# Patient Record
Sex: FEMALE
[De-identification: names, ages, dates, MRNs, and addresses within clinical notes are randomized per-mention and may not be internally consistent; named-entity substitution may affect disease eponyms.]

---

## 2020-06-08 LAB
ANION GAP SERPL CALC-SCNC: 14.5 MMOL/L (ref 8–16)
BASOPHILS # BLD AUTO: 0 10*3/UL (ref 0–0.1)
BASOPHILS NFR BLD AUTO: 0.7 % (ref 0–1)
BUN SERPL-MCNC: 21 MG/DL (ref 7–26)
BUN/CREAT SERPL: 18 (ref 6–25)
CALCIUM SERPL-MCNC: 9 MG/DL (ref 8.4–10.2)
CHLORIDE SERPL-SCNC: 108 MMOL/L (ref 98–107)
CO2 SERPL-SCNC: 24 MMOL/L (ref 22–29)
DEPRECATED APTT PLAS QN: 28.1 SECONDS (ref 23.8–35.5)
DEPRECATED INR PLAS: 0.92
DEPRECATED NEUTROPHILS # BLD AUTO: 2.2 10*3/UL (ref 2.1–6.9)
EGFRCR SERPLBLD CKD-EPI 2021: 45 ML/MIN (ref 60–?)
EOSINOPHIL # BLD AUTO: 0.1 10*3/UL (ref 0–0.4)
EOSINOPHIL NFR BLD AUTO: 1.9 % (ref 0–6)
ERYTHROCYTE [DISTWIDTH] IN CORD BLOOD: 13 % (ref 11.7–14.4)
GLUCOSE SERPLBLD-MCNC: 108 MG/DL (ref 74–118)
HCT VFR BLD AUTO: 35.5 % (ref 34.2–44.1)
HGB BLD-MCNC: 11.5 G/DL (ref 12–16)
LYMPHOCYTES # BLD: 1.6 10*3/UL (ref 1–3.2)
LYMPHOCYTES NFR BLD AUTO: 37.8 % (ref 18–39.1)
MCH RBC QN AUTO: 30.1 PG (ref 28–32)
MCHC RBC AUTO-ENTMCNC: 32.4 G/DL (ref 31–35)
MCV RBC AUTO: 92.9 FL (ref 81–99)
MONOCYTES # BLD AUTO: 0.4 10*3/UL (ref 0.2–0.8)
MONOCYTES NFR BLD AUTO: 8.6 % (ref 4.4–11.3)
NEUTS SEG NFR BLD AUTO: 50.8 % (ref 38.7–80)
PLATELET # BLD AUTO: 159 X10E3/UL (ref 140–360)
POTASSIUM SERPL-SCNC: 4.5 MMOL/L (ref 3.5–5.1)
PROTHROMBIN TIME: 12.9 SECONDS (ref 11.9–14.5)
RBC # BLD AUTO: 3.82 X10E6/UL (ref 3.6–5.1)
SODIUM SERPL-SCNC: 142 MMOL/L (ref 136–145)

## 2020-06-08 NOTE — DIAGNOSTIC IMAGING REPORT
EXAM:  CHEST 2 VIEWS



DATE: 6/8/2020 3:56 PM  



INDICATION: Preoperative evaluation 



COMPARISON: None



FINDINGS:

The trachea is midline. The lungs are symmetrically expanded without evidence

for large focal consolidation, pneumothorax, or significant pleural effusion.



The cardiomediastinal silhouette and pulmonary vasculature are within normal

limits. Mild tortuosity noted of the thoracic aorta. Degenerative changes noted

of the visualized spine and shoulders. No acute osseous abnormality is

identified. The surrounding soft tissues are unremarkable.





IMPRESSION:



No acute cardiopulmonary process identified.



Signed by: Dr. Mazin Coleman MD on 6/8/2020 4:45 PM

## 2020-06-11 ENCOUNTER — HOSPITAL ENCOUNTER (OUTPATIENT)
Dept: HOSPITAL 88 - OR | Age: 75
Setting detail: OBSERVATION
LOS: 1 days | Discharge: HOME | End: 2020-06-12
Attending: NEUROLOGICAL SURGERY | Admitting: NEUROLOGICAL SURGERY
Payer: MEDICARE

## 2020-06-11 VITALS — BODY MASS INDEX: 39.15 KG/M2 | WEIGHT: 235 LBS | HEIGHT: 65 IN

## 2020-06-11 VITALS — DIASTOLIC BLOOD PRESSURE: 50 MMHG | SYSTOLIC BLOOD PRESSURE: 116 MMHG

## 2020-06-11 VITALS — SYSTOLIC BLOOD PRESSURE: 137 MMHG | DIASTOLIC BLOOD PRESSURE: 62 MMHG

## 2020-06-11 VITALS — SYSTOLIC BLOOD PRESSURE: 134 MMHG | DIASTOLIC BLOOD PRESSURE: 72 MMHG

## 2020-06-11 VITALS — SYSTOLIC BLOOD PRESSURE: 116 MMHG | DIASTOLIC BLOOD PRESSURE: 50 MMHG

## 2020-06-11 DIAGNOSIS — Z85.528: ICD-10-CM

## 2020-06-11 DIAGNOSIS — Z85.3: ICD-10-CM

## 2020-06-11 DIAGNOSIS — M19.90: ICD-10-CM

## 2020-06-11 DIAGNOSIS — Z90.5: ICD-10-CM

## 2020-06-11 DIAGNOSIS — Z79.84: ICD-10-CM

## 2020-06-11 DIAGNOSIS — I10: ICD-10-CM

## 2020-06-11 DIAGNOSIS — Z01.810: ICD-10-CM

## 2020-06-11 DIAGNOSIS — M51.16: Primary | ICD-10-CM

## 2020-06-11 DIAGNOSIS — Z11.59: ICD-10-CM

## 2020-06-11 DIAGNOSIS — E78.00: ICD-10-CM

## 2020-06-11 DIAGNOSIS — Z90.49: ICD-10-CM

## 2020-06-11 DIAGNOSIS — Z01.812: ICD-10-CM

## 2020-06-11 DIAGNOSIS — Z87.891: ICD-10-CM

## 2020-06-11 DIAGNOSIS — Z01.818: ICD-10-CM

## 2020-06-11 DIAGNOSIS — E11.9: ICD-10-CM

## 2020-06-11 PROCEDURE — 63042 LAMINOTOMY SINGLE LUMBAR: CPT

## 2020-06-11 PROCEDURE — 86850 RBC ANTIBODY SCREEN: CPT

## 2020-06-11 PROCEDURE — 80048 BASIC METABOLIC PNL TOTAL CA: CPT

## 2020-06-11 PROCEDURE — 86900 BLOOD TYPING SEROLOGIC ABO: CPT

## 2020-06-11 PROCEDURE — 85730 THROMBOPLASTIN TIME PARTIAL: CPT

## 2020-06-11 PROCEDURE — 36415 COLL VENOUS BLD VENIPUNCTURE: CPT

## 2020-06-11 PROCEDURE — 72020 X-RAY EXAM OF SPINE 1 VIEW: CPT

## 2020-06-11 PROCEDURE — 85025 COMPLETE CBC W/AUTO DIFF WBC: CPT

## 2020-06-11 PROCEDURE — 93005 ELECTROCARDIOGRAM TRACING: CPT

## 2020-06-11 PROCEDURE — 71046 X-RAY EXAM CHEST 2 VIEWS: CPT

## 2020-06-11 PROCEDURE — 88304 TISSUE EXAM BY PATHOLOGIST: CPT

## 2020-06-11 PROCEDURE — 87635 SARS-COV-2 COVID-19 AMP PRB: CPT

## 2020-06-11 PROCEDURE — 82948 REAGENT STRIP/BLOOD GLUCOSE: CPT

## 2020-06-11 PROCEDURE — 85610 PROTHROMBIN TIME: CPT

## 2020-06-11 RX ADMIN — CEFAZOLIN SODIUM SCH MLS/HR: 1 SOLUTION INTRAVENOUS at 19:59

## 2020-06-11 RX ADMIN — CLONIDINE HYDROCHLORIDE SCH MG: 0.2 TABLET ORAL at 17:41

## 2020-06-11 RX ADMIN — SODIUM CHLORIDE, POTASSIUM CHLORIDE, SODIUM LACTATE AND CALCIUM CHLORIDE SCH MLS/HR: 600; 310; 30; 20 INJECTION, SOLUTION INTRAVENOUS at 22:05

## 2020-06-11 RX ADMIN — METFORMIN HYDROCHLORIDE SCH MG: 500 TABLET, FILM COATED ORAL at 17:41

## 2020-06-11 NOTE — XMS REPORT
Continuity of Care Document

                             Created on: 2020



RUBEN RAO

External Reference #: [id number]

: 1945

Sex: Female



Demographics





                          Address                   [address] [place]  [address]

 

                          Home Phone                [phone]

 

                          Preferred Language        English

 

                          Marital Status            Unknown

 

                          Judaism Affiliation     Unknown

 

                          Race                      Unknown

 

                                        Additional Race(s) 

 

 

                          Ethnic Group              Unknown





Author





                          Author                    [facility]

t

 

                          Organization              [facility]

 

                          Address                   [address]

[place]  [address]



 

                          Phone                     Unavailable







Support





                Name            Relationship    Address         Phone

 

                    RAUL RAO        PRS                 [address]

[place]  [address]                     [phone]

 

                    RAUL RAO        PRS                 [address]

[place]  [address]                     [phone]

 

                    LEBRON RAO      PRS                 [address] [place]  [address]                      [phone]

 

                RAULEDA RAO      ECON            Unknown         [phone]







Care Team Providers





                    Care Team Member Name Role                Phone

 

                    ERWIN DIAZ   Attphys             Unavailable







Payers





           Payer Name Policy Type Policy Number Effective Date Expiration Date S

ource







Problems





           Condition Name Condition Details Condition Category Status     Onset 

Date Resolution

Date            Last Treatment Date Treating Clinician Comments        Source

 

                          Osteopenia                                        Oste

openia                        Active     

                                                                  2014    
                                           [facility]                     

Problem   Active                        2014 14:32:56                     

Richelle Freeman

 

                          Diabetes Mellitus                                 Diab

etes Mellitus                     

  Active                                                                        
2014                                                [facility]         
           Problem Active                  2014 14:32:56                 [facility]

 

                          Hypertension                                      Hype

rtension                        Active 

                                                                      2014
                                               [facility]                    
        Problem Active                  2014 14:32:56                 [facility]

 

                          Joint Pain, Localized In The Shoulder                 

        Joint Pain, 

Localized In The Shoulder                        Active                         
                                              2014                        
                       [facility]                     Problem      Active    

                             

2014 22:19:21                                         Richelle Freeman

 

                          Sciatica                                          Scia

patricia                        Active         

                                                              2014        
                                       [facility]                     Problem

          Active                        2014 22:19:21                     

Richelle Freeman

 

                          Breast Pain                                       [place]

st Pain                        Active   

                                                                    2014  
                                             [facility]                     

Problem   Active                        2014 22:19:21                     

Richelle Freeman

 

                          Multiple Environmental Allergies                      

   Multiple Environmental 

Allergies                        Active                                         
                              2014                                        
       [facility]                     Problem    Active                      

     2014 22:19:21 

                                                    Richelle Freeman

 

                          Insomnia                                          Inso

mnia                        Active         

                                                              2014        
                                       [facility]                     Problem

          Active                        2014 14:32:56                     

Richelle Freeman

 

                          Rheumatoid Arthritis                              Rheu

matoid Arthritis               

        Active                                                                  
     2014                                                [facility]    
                Problem Active                  2014 14:32:56             

    Richelle Freeman

 

                          Allergic Rhinitis                                 Candelario

rgic Rhinitis                     

  Active                                                                        
2014                                                [facility]         
           Problem Active                  2014 14:32:56                 M

emokrystal Freeman







Allergies, Adverse Reactions, Alerts





        Allergy Name Allergy Type Status  Severity Reaction(s) Onset Date Inacti

ve Date 

Treating Clinician        Comments                  Source

 

       No Known Allergies DA     Active U             2016-10-01 00:00:00       

               [facility]

 

       No Known Drug Allergies No Known Drug Allergies Active                   

                        Richelle Freeman







Family History





           Family Member Diagnosis  Comments   Start Date Stop Date  Source

 

           Unknown Family Member Family History            2013 16:31:17 2

 16:31:17 

Richelle Freeman







Social History





           Social Habit Start Date Stop Date  Quantity   Comments   Source

 

           Social History 2014 14:32:56 2014 14:32:56               

        Richelle Freeman







Medications





             Ordered Medication Name Filled Medication Name Start Date   Stop Da

te    Current 

Medication? Ordering Clinician Indication Dosage     Frequency  Signature (SIG) 

Comments                  Components                Source

 

     Melatonin TABS      2014 14:32:56      Yes                       (Act

miller)           Richelle Freeman

 

     Calcium TABS      2014 14:32:56      Yes                       (Activ

e)           Richelle Freeman

 

     Vitamin D CAPS      2014 14:32:56      Yes                       (Act

miller)           Richelle Freeman

 

     Vitamin E CAPS      2014 14:32:56      Yes                       (Act

miller)           Richelle Freeman

 

     Co Q 10 CAPS      2014 14:32:56      Yes                       (Activ

e)           Richelle Freeman

 

     Sleep Aid TABS      2014 14:32:56      Yes                       (Act

miller)           Richelle Freeman

 

           Fluticasone Propionate 50 MCG/ACT Nasal Suspension            2014 06:00:00            Yes         

                                        ; Start Date: 2014; End Date: 1900 (Active)                     Richelle Freeman

 

       Azithromycin 500 MG Oral Tablet        2014 06:00:00        Yes    

                            ; Start 

Date: 2014; End Date: 2014 (Active)                                 

        Richelle Freeman

 

       Benzonatate 200 MG Oral Capsule        2014 06:00:00        Yes    

                            ; Start 

Date: 2014; End Date: 2014 (Active)                                 

        Richelle Freeman

 

       PredniSONE 20 MG Oral Tablet        2014 06:00:00        Yes       

                         ; Start Date: 

2014; End Date: 2014 (Active)                                       

  Richelle Freeman

 

           Fluticasone Propionate 50 MCG/ACT Nasal Suspension            2014 06:00:00            Yes         

                                        ; Start Date: 2014; End Date: 1900 (Active)                     Richelle Freeman

 

     Actonel TABS      2013 20:04:52      Yes                       (Activ

e)           Richelle Freeman

 

       Meloxicam 15 MG Oral Tablet        2013 05:00:00        Yes        

                        ; Start Date: 

2013; End Date: 1900 (Active)                                       

  [facility]

 

     PredniSONE 10 MG Oral Tablet      2013 16:31:17      Yes             

          (Active)           

Richelle Freeman

 

     Fluticasone Propionate SUSP      2013 16:31:17      Yes              

         (Active)           

[facility]

 

      MetFORMIN HCl 500 MG Oral Tablet       2013 16:31:17       Yes      

                      (Active)  

                                                    [facility]

 

     Ambien 5 MG Oral Tablet      2013 16:31:17      Yes                  

     (Active)           

[facility]

 

             Lisinopril-Hydrochlorothiazide 20-12.5 MG Oral Tablet              

2013 16:31:17              

Yes                                      (Active)                 [facility]

nn

 

      CloNIDine HCl 0.2 MG Oral Tablet       2013 16:31:17       Yes      

                      (Active)  

                                                    [facility]

 

       MetFORMIN HCl 500 MG Oral Tablet        1900 06:00:00        Yes   

                             ; Start 

Date: 1900; End Date: 1900 (Active)                                 

        [facility]

 

             Lisinopril-Hydrochlorothiazide 20-12.5 MG Oral Tablet              

1900 06:00:00              

Yes                                     ; Start Date: 1900; End Date: 1900 (Active)                 [facility]

 

       CloNIDine HCl 0.2 MG Oral Tablet        1900 06:00:00        Yes   

                             ; Start 

Date: 1900; End Date: 1900 (Active)                                 

        [facility]

 

       Actonel 35 MG Oral Tablet        1900 06:00:00        Yes          

                      ; Start Date: 

1900; End Date: 1900 (Active)                                       

  [facility]







Procedures

This patient has no known procedures.



Plan of Care





             Planned Activity Planned Date Details      Comments     Source

 

             Future Scheduled Test 2014 14:32:56 Plan of Care [code = 1877

6-5]              

[facility]

 

             Future Scheduled Test 2014 21:30:15 Plan of Care [code = 1877

6-5]              

[facility]

 

             Future Scheduled Test 2014 22:19:21 Plan of Care [code = 1877

6-5]              

[facility] Scheduled Test 2013 20:03:40 Plan of Care [code = 1877

6-5]              

[facility] Scheduled Test 2013 16:02:16 Plan of Care [code = 1877

6-5]              

[facility]







Encounters





             Start Date/Time End Date/Time Encounter Type Admission Type Attendi

RUST   Care Department Encounter ID    Source

 

        2019 07:49:00 2019 07:49:00 Outpatient                 [facility] 

   [facility]    [id number]    [facility]

 

        2014 08:32:57 2014 08:32:56 Outpatient                 [facility] 

   [facility]    [id number]  

 

        2014 15:30:16 2014 15:30:15 Outpatient                 [facility] 

   [facility]    [id number]  

 

        2014 16:19:21 2014 16:19:21 Outpatient                 [facility] 

   [facility]    [id number]  

 

        2013 14:03:41 2013 14:03:40 Outpatient                 [facility] 

   [facility]    [id number]  

 

        2013 15:04:52 2013 15:04:52 Outpatient                 [facility] 

   [facility]    [id number]  

 

        2013 11:02:40 2013 11:02:16 Outpatient                 [facility] 

   [facility]    [id number]  

 

        2013 11:31:38 2013 11:31:17 Outpatient                 [facility] 

   [facility]    [id number]  







Results





           Test Description Test Time  Test Comments Results    Result Comments 

Source

 

                CHEST 2 VIEWS   2020 16:44:00                             

                                

                                         [facility]      
                 [address] [place] [address]      
Patient Name: RUBEN RAO                                MR #: [id number]   
              : 1945                                   Age/Sex: 74/F  
Acct #: [id number]                              Req #: [id number]  [facility] 
Physician:                                                      Ordered by: 
ERWIN DIAZ MD                         Report #: [id number]        Location:
OR                                      Room/Bed:                   
________________________________________________________________________________

___________________    Procedure: [id number] DX/CHEST 2 VIEWS  Exam Date: 
20                            Exam Time: 1556                             
                REPORT STATUS: Signed    EXAM:  CHEST 2 VIEWS      DATE: 
2020 3:56 PM        INDICATION: Preoperative evaluation       COMPARISON: 
None      FINDINGS:   The trachea is midline. The lungs are symmetrically 
expanded without evidence   for large focal consolidation, pneumothorax, or 
significant pleural effusion.      The cardiomediastinal silhouette and 
pulmonary vasculature are within normal   limits. Mild tortuosity noted of the 
thoracic aorta. Degenerative changes noted   of the visualized spine and 
shoulders. No acute osseous abnormality is   identified. The surrounding soft 
tissues are unremarkable.         IMPRESSION:      No acute cardiopulmonary 
process identified.      Signed by: Dr. Mazin Coleman MD on 2020 4:45 PM    
   Dictated By: MAZIN COLEMAN MD  Electronically Signed By: MAZIN COLEMAN MD on 
20 1645  Transcribed By: RAMSES on 20 1645       COPY TO:   ERWIN CASTILLO MD                                          

 

                SCR MAMM BILATERAL ROSALES CAD DIGITAL 2019 08:25:19         

         - SCR MAMM BILATERAL 

ROSALES CAD DIGITALBILATERAL DIGITAL SCREENING MAMMOGRAM 3D/2D WITH CAD: 
2019CLINICAL: Asymptomatic.  Digital breast tomosynthesis was performed in 
addition to routine CC and MLO views.  Current mammographic images were 
evaluated by either a StormWind M-Vu or a Yoono ImageChecker CAD (computer aided 
detection system).  Comparison is made to exams dated  2018 mammogram, 2016 mammogram, and 2015 mammogram - [facility]-.  The 
tissue of both breasts is heterogeneously dense. This may lower the sensitivity 
of mammography.  There are post operative findings in the left breast.  The 
previously noted seroma in the lower inner quadrant of the left breast, 
posterior depth, appears slightly smaller than on the prior exam.  No new 
suspicious mass, architectural distortion, malignant type calcification, or 
lymph node abnormality detected.  Breast architecture is stable compared to 
prior exams.IMPRESSION: NEGATIVEThere is no mammographic evidence of malignancy.
Resume annual screening mammography in one year.  Cathryn Cantrell M.D.        
 ar/:2019 08:25:19  Imaging Technologist: Marlni TANNER, [facility]-FWletter sent: BIRADS 1-2 Normal  Mammogram BI-RADS: 1 Negative         

                   

 

                INTERVERTEBRAL DISC 2019 12:23:00                 

--------------------------------------------------------------------------------

------------RUN DATE: 19                         [facility]           
              PAGE 1   RUN TIME: 1223                            Specimen 
Inquiry                    RUN USER: INTERFACE                                  
                        
----------------------------------------------------------------
----------------------------PATIENT: RUBEN RAO       ACCT #: 
[id number] LOC:  [facility]    U #: [id number]                                   
   AGE/SX: 73/F         ROOM: [facility]     RE19REG DR:  rEwin Diaz MD           :    [birth year]     BED:  A          DIS:                         
                      STATUS: ADM Coleen      TLOC:           
----------------------------
---------------------------------------------------------------- SPEC #: 
[id number]     RECD:      STATUS:  ZULEYMA BROWN #: 
[id number]                           ADDIS:      [facility] DR: 
Erwin Diaz MD            ENTERED:      SP TYPE: INT DISC     
 OTHR DR: Arnaldo Hendrickson MD           ORDERED:  GROSS                         
                                                    COPIES TO:   Arnaldo Hendrickson MD   [address] [place] [address] [phone]    
Erwin Diaz MD   [address] [address] [phone] 
PROCEDURES: GROSS () TISSUES:           LUMBAR REGION - LAMIA, 
LIGAMENT         CLINICAL HISTORY    COLLECTION DATE: 3/20/19       L3-4 SPINAL 
STENOSIS         FINAL DIAGNOSIS    Lumbar lamina and ligament, laminectomy:    
   DENSE CONNECTIVE TISSUE COMPATIBLE WITH LIGAMENT        FRAGMENTS OF 
UNREMARKABLE BONE        CARTILAGE WITH MILD DEGENERATIVE CHANGE         
NEGATIVE FOR MALIGNANCY           RRB/kaity   D   [address], [address]           
MACROSCOPIC    The specimen is received in formalin, labeled with the patient's 
name, and   identified as "lumbar lamina and ligament".  It consists of i
rregular fragments   of tan to light pink fibrous appearing tissue and possible 
fragments of bone.   The specimen measures 3.0 X 2.2 X up to 0.8 cm in 
aggregate.  After                                ** CONTINUED ON NEXT PAGE ** 
--------------------------------------------------------------------------------

------------RUN DATE: 19                         [facility]           
              PAGE 2   RUN TIME: 1223                            Specimen 
Inquiry                    RUN USER: INTERFACE                                  
                        -------------------------------------
-------------------------------------------------------SPEC #: [id number]   
PATIENT: RUBEN RAO        #[id number]  
(Continued)-------------------------------------------------------
-------------------------------------           MACROSCOPIC             
(Continued)    decalcification, representative portions of tissue are submitted 
for histologic   evaluation in a single cassette.       GROSS PERFORMED AT [facility]   [address] [place] [address] (p)[phone]           MICROSCOPIC    All of 
the stains, including any controls performed, stain appropriately.       
MICROSCOPIC PERFORMED AT [facility]  
[address] [place] [address] [phone])[phone]         PERFORMING 
SITE    Diagnosis performed at:        [facility]       
[address] [facility], [place] [address]        
[phone]-----------------------------------------
--------------------------------------------------- Signed SIGNATURE ON FILE    
                   Jean-Paul Del Cid MD 19 1223  
-------------------------------------------------------------------
-------------------------                                                     **
END OF REPORT **                                     

 

                    GLUBED              2019 11:33:00   

 

                                        Test Item

 

             GLUBED (test code = GLUBED) 116 mg/dL           H            

Performed by certified  

at [facility]





[id number] 07:47:00* 



             Test Item    Value        Reference Range Interpretation Comments

 

             GLUBED (test code = GLUBED) 88 mg/dL            N            

Performed by certified  at

[facility]





[id number] 01:16:00* 



             Test Item    Value        Reference Range Interpretation Comments

 

             GLUBED (test code = GLUBED) 112 mg/dL           H            

Performed by certified  

at [facility]





[id number] 15:34:00* 



             Test Item    Value        Reference Range Interpretation Comments

 

             GLUBED (test code = GLUBED) 168 mg/dL           H            

Performed by certified  

at [facility]





[id number] 10:57:00* 



             Test Item    Value        Reference Range Interpretation Comments

 

             GLUBED (test code = GLUBED) 190 mg/dL           H            

Performed by certified  

at [facility]





- XR SPINE 1 V SPEC [id number] 09:26:00 FAX: Arnaldo Saeed MD  
[phone]    [place]:    St: [facility] FAX: Erwin Rosas MD  
[phone]   ----------------------------------------
---------------------------------------  Name:   RUBEN RAO        
 [facility]                     : 1945  Age/S: 73/F           [address]                Unit #: [id number]      Loc: [place]  [address]              Phys: Erwin Diaz MD                                
                Acct: [id number] Dis Date:               Status: REG [facility]      
                         PHONE #: [phone]     Exam Date:     2019                   FAX #: [phone]     Reason: LAMINECTOMY              
                         EXAMS:                                               
CPT CODE:      [id number] XR SPINE 1 V SPEC LEVEL                    [id number]       
            HISTORY: Laminectomy.               COMPARISON: None available.     
         Crosstable lateral lumbar spine obtained at 7:58 AM demonstrating      
marker at L3-L4 level.               Crosstable lateral lumbar spine obtained at
8:07 AM demonstrating       marker at L3-L4 level.          ** Electronically 
Signed by ANAYA Juan on 2019 at 0999 **                      R
eported and signed by: Ethan Juan M.D.                              CC: Savi schroeder,Arnaldo Ndiaye; Erwin Diaz MD                                             
                                                  Technologist: VASILIY HOUSTON JR 
                                      Trnscrd Date/Time/By: 2019 (926) : 
By: [email or web address]           Orig Print D/T: S: 2019 (1342)                    
    PAGE  1                       Signed Report                               - 
XR SPINE 1 V SPEC [id number] 09:26:00 FAX: Arnaldo Saeed MD  
[phone]    [place]:    St: REG FAX: Erwin Rosas MD  
[phone]   ----------------------------------------
---------------------------------------  Name:   JALENRUBENSHIMA HILL        
 [facility]                     : 1945  Age/S: 73/F           [address] 
[facility]                Unit #: [id number]      Loc: [place]  
[place]  [address]              Phys: Erwin Diaz MD                                
                Acct: [id number] Dis Date:               Status: REG [facility]      
                         PHONE #: [phone]     Exam Date:     2019                   FAX #: [phone]     Reason: LAMINECTOMY              
                         EXAMS:                                               
CPT CODE:      [id number] XR SPINE 1 V SPEC LEVEL                    [id number]       
            HISTORY: Laminectomy.               COMPARISON: None available.     
         Crosstable lateral lumbar spine obtained at 7:58 AM demonstrating      
marker at L3-L4 level.               Crosstable lateral lumbar spine obtained at
8:07 AM demonstrating       marker at L3-L4 level.          ** Electronically 
Signed by ANAYA Juan on 2019 at 0926 **                      R
eported and signed by: Ethan Juan M.D.                              CC: Arnaldo Vásquez; Erwin Diaz MD                                             
                                                  Technologist: VASILIY HOUSTON JR 
                                      Trnscrd Date/Time/By: 2019 (59) : 
By: [email or web address]           Orig Print D/T: S: 2019 (3537)                    
    PAGE  1                       Signed Report                               
[id number] 07:55:00* 



             Test Item    Value        Reference Range Interpretation Comments

 

             GLUBED (test code = GLUBED) 116 mg/dL           H            

Performed by certified  

at [facility]





PROTHROMBIN [id number] 17:18:00* 



             Test Item    Value        Reference Range Interpretation Comments

 

             PROTHROMBIN TIME PATIENT (test code = PTP) 12.2 seconds 9.0-14.0   

  N             

 

             INTERNATIONAL NORMAL RATIO (test code = INR) 1.0          0.8-1.2  

    N            The therapeutic range

for oral anticoagulant therapy formost indications is an international 
normalized ratio (INR)of between 2.0 and 3.0.  The recommended therapeutic 
INRrange for various clinical situations is listed 
below:_________________________________________________________Clinical 
Situation                          INR 
range_________________________________________________________ Pulmonary e
mbolism treatment              (2.0-3.0)Venous thrombosis treatmentVenous 
thrombosis prophylaxis (high risk surgery)Prevention of systemic embolism from: 
       Acute myocardial infarction         Valvular heart disease         Atrial
fibrillation Mechanical prosthetic heart valves          (2.5-3.5)





THROMBOPLASTIN TIME [id number] 17:18:00* 



             Test Item    Value        Reference Range Interpretation Comments

 

             THROMBOPLASTIN TIME PARTIAL (test code = PTT) 33.0 seconds 25.0-36.

5    N             





BASIC METABOLIC [id number] 16:44:00* 



             Test Item    Value        Reference Range Interpretation Comments

 

             SODIUM (test code = NA) 142 mmol/L   136-145      N             

 

             POTASSIUM (test code = K) 4.6 mmol/L   3.5-5.1      N             

 

             CHLORIDE (test code = CL) 108.0 mmol/L        H             

 

             CARBON DIOXIDE (test code = CO2) 27.0 mmol/L  21-32        N       

      

 

             ANION GAP (test code = GAP) 11.6         10-20        N            

 

 

             GLUCOSE (test code = GLU) 109 mg/dL           H             

 

             BLOOD UREA NITROGEN (test code = BUN) 30 mg/dL     7-18         H  

           

 

             GLOMERULAR FILTRATION RATE (test code = GFR) 44 mL/min    >=60     

                 Estimated GFR by 

using Modified MDRD formula.Chronic kidney disease is defined as either kidney 
damageor GFR <60 mL/min/1.73 m2 for >3 months.

 

             CREATININE (test code = CREAT) 1.20 mg/dL   0.55-1.02    H         

   **Note change in reference

range due to change in reagent.**

 

             BUN/CREATININE RATIO (test code = BUN/CREA) 25.0         10-20     

   H             

 

             CALCIUM (test code = CA) 8.7 mg/dL    8.5-10.1     N             





CBC W/AUTO [id number] 16:21:00* 



             Test Item    Value        Reference Range Interpretation Comments

 

             WHITE BLOOD CELL (test code = WBC) 6.3 K/mm3    4.5-12.5     N     

        

 

             RED BLOOD CELL (test code = RBC) 3.96 mill/mm3 3.7-5.2      N      

       

 

             HEMOGLOBIN (test code = HGB) 12.0 gram/dL 11.5-15.5    N           

  

 

             HEMATOCRIT (test code = HCT) 37.1 %       36.0-46.0    N           

  

 

             MEAN CELL VOLUME (test code = MCV) 93.7 fL      80-98        N     

        

 

             MEAN CELL HGB (test code = MCH) 30.3 picogram 27.0-33.0    N       

      

 

             MEAN CELL HGB CONCETRATION (test code = MCHC) 32.3 gram/dL 33.0-36.

0    L             

 

             RED CELL DISTRIBUTION WIDTH (test code = RDW) 12.1 %       11.6-16.

2    N             

 

             RED CELL DISTRIBUTION WIDTH SD (test code = RDW-SD) 42.0 fL      37

.0-51.0    N             

 

             PLATELET COUNT (test code = PLT) 198 K/mm3    150-450      N       

      

 

             MEAN PLATELET VOLUME (test code = MPV) 11.3 fL      6.7-11.0     H 

            

 

             NEUTROPHIL % (test code = NT%) 67.1 %       39.0-69.0    N         

    

 

             IMMATURE GRANULOCYTE % (test code = IG%) 0.3 %        0.0-5.0      

N             

 

             LYMPHOCYTE % (test code = LY%) 24.3 %       25.0-55.0    L         

    

 

             MONOCYTE % (test code = MO%) 6.8 %        0.0-10.0     N           

  

 

             EOSINOPHIL % (test code = EO%) 1.0 %        0.0-5.0      N         

    

 

             BASOPHIL % (test code = BA%) 0.5 %        0.0-1.0      N           

  

 

             NUCLEATED RBC % (test code = NRBC%) 0.0 %        0-0          N    

         

 

             NEUTROPHIL # (test code = NT#) 4.22 K/mm3   1.8-7.7      N         

    

 

             IMMATURE GRANULOCYTE # (test code = IG#) 0.02 x10 3/uL 0-0.03      

 N             

 

             LYMPHOCYTE # (test code = LY#) 1.53 K/mm3   1.0-5.0      N         

    

 

             MONOCYTE # (test code = MO#) 0.43 K/mm3   0-0.8        N           

  

 

             EOSINOPHIL # (test code = EO#) 0.06 K/mm3   0.0-0.5      N         

    

 

             BASOPHIL # (test code = BA#) 0.03 K/mm3   0.0-0.2      N           

  

 

             NUCLEATED RBC # (test code = NRBC#) 0.00 K/mm3   0.0-0.1      N    

         

 

             MANUAL DIFF REQUIRED (test code = MDIFF) NO                        

              





CBC W/AUTO [id number] 16:18:00* 



             Test Item    Value        Reference Range Interpretation Comments

 

             WHITE BLOOD CELL (test code = WBC)  K/mm3       4.5-12.5           

        

 

             RED BLOOD CELL (test code = RBC)  mill/mm3    3.7-5.2              

      

 

             HEMOGLOBIN (test code = HGB) 12.0 gram/dL 11.5-15.5    N           

  

 

             HEMATOCRIT (test code = HCT) 37.1 %       36.0-46.0    N           

  

 

             MEAN CELL VOLUME (test code = MCV)  fL          80-98              

        

 

             MEAN CELL HGB (test code = MCH)  picogram    27.0-33.0             

     

 

             MEAN CELL HGB CONCETRATION (test code = MCHC)  gram/dL     33.0-36.

0                  

 

             RED CELL DISTRIBUTION WIDTH (test code = RDW)  %           11.6-16.

2                  

 

             RED CELL DISTRIBUTION WIDTH SD (test code = RDW-SD)  fL          37

.0-51.0                  

 

             PLATELET COUNT (test code = PLT)  K/mm3       150-450              

      

 

             MEAN PLATELET VOLUME (test code = MPV)  fL          6.7-11.0       

            

 

             NEUTROPHIL % (test code = NT%)  %           39.0-69.0              

    

 

             IMMATURE GRANULOCYTE % (test code = IG%)  %           0.0-5.0      

              

 

             LYMPHOCYTE % (test code = LY%)  %           25.0-55.0              

    

 

             MONOCYTE % (test code = MO%)  %           0.0-10.0                 

  

 

             EOSINOPHIL % (test code = EO%)  %           0.0-5.0                

    

 

             BASOPHIL % (test code = BA%)  %           0.0-1.0                  

  

 

             NEUTROPHIL # (test code = NT#)  K/mm3       1.8-7.7                

    

 

             LYMPHOCYTE # (test code = LY#)  K/mm3       1.0-5.0                

    

 

             MONOCYTE # (test code = MO#)  K/mm3       0-0.8                    

  

 

             EOSINOPHIL # (test code = EO#)  K/mm3       0.0-0.5                

    

 

             BASOPHIL # (test code = BA#)  K/mm3       0.0-0.2                  

  





- XR CHEST 2 -77-17 16:08:00 FAX: Arnaldo Saeed MD  [phone]
   [place]: O   St: PRE FAX: Erwin Rosas MD  [phone]   
------------------------------------------------------------------------------- 
Name:   RUBEN RAO          [facility]                     :
1945  Age/S: 73/F           [address]                Unit #: 
[id number]      Loc: [place]  [address]              Phys: 
Erwin Diaz MD                                                 Acct: 
[id number] Dis Date:               Status: PRE SDC                            
   PHONE #: [phone]     Exam Date:     2019     6957                 
 FAX #: [phone]     Reason: PRE OP                                         
   EXAMS:                                               CPT CODE:      [id number]
XR CHEST 2 V                               [id number]                    EXAM: Chest 
x-ray, 2 views;               INFORMATION: Lumbar spinal stenosis; preop;       
       FINDINGS:       Lungs are clear; no infiltrates, no edema; no effusions, 
no       pneumothorax.       Calcifications and slight tortuosity of the 
thoracic aorta; the heart       is normal in size.                 IMPRESSION:  
      No evidence of active cardiopulmonary disease.                   ** 
Electronically Signed by ANAYA Salazar **           **             on 
2019 at 1602             **                      Reported and signed by: 
Freddy Salazar M.D.                          CC: Arnaldo Hendrickson; 
Erwin Diaz MD                                                             
                                  Technologist: WAI Mckeon)          
                      Trnscrd Date/Time/By: 2019 (8624) : By: AlexGRW   
       Orig Print D/T: S: 2019 (6856)                         PAGE  1     
                 Signed Report

## 2020-06-11 NOTE — XMS REPORT
[facility] [address] Medical Summary

                             Created on: 2013



RAOSHASHARUBEN

External Reference #: [id number]

: 1945

Sex: Female



Demographics





                          Address                   [address] [place]  [address]

 

                          Home Phone                [phone]

 

                          Preferred Language        English

 

                          Marital Status            Unknown

 

                          [place] Affiliation     Unknown

 

                          Race                      Unknown

 

                          Ethnic Group              Unknown





Author





                          Author                    RUBEN HERRERA

 

                          Organization              Unknown

 

                          Address                   Unknown

 

                          Phone                     [phone]







Care Team Providers





                    Care Team Member Name Role                Phone

 

                    SHARON PADMINI        PP                  [phone]

 

                          Unavailable               [phone]







Reason for Referral

No Reason for Referral was given.



History of Present Illness

No HPI available.



Problems

* Diabetes Mellitus (250.00);        (Active)    

* Hypertension (401.9);        (Active)    

* Joint Pain, Localized In The Shoulder (719.41);        (Active)    

* Osteopenia (733.90);        (Active)    

* Normal Routine History And Physical Senior Citizen (65-80) (V70.0);        (
  Active)    

* Sciatica (724.3);        (Active)    





Medication

* Lisinopril-Hydrochlorothiazide 20-12.5 MG Oral Tablet; TAKE 1 TABLET DAILY.; 
  Start Date: 1900; End Date: 1900 (Active)

* Actonel 35 MG Oral Tablet; TAKE 1 TABLET WEEKLY ON AN EMPTY STOMACH 30-60 
  MINUTES BEFORE BREAKFAST WITH 8-12 OUNCES OF WATER.  DO NOT LIE DOWN AFTER 
  TAKING PILL.; Start Date: 1900; End Date: 1900 (Active)

* MetFORMIN HCl 500 MG Oral Tablet; TAKE 1 TABLET TWICE DAILY; Start Date: 
  1900; End Date: 1900 (Active)

* Vitamin E CAPS; TAKE 1 CAPSULE DAILY. (Active)

* Co Q 10 CAPS; TAKE AS DIRECTED. (Active)

* Meloxicam 15 MG Oral Tablet; TAKE 1 TABLET DAILY WITH FOOD.; Start Date: 
  2013; End Date: 1900 (Active)

* Sleep Aid TABS; TAKE 1 TABLET BEDTIME PRN (Active)

* Melatonin TABS; TAKE 1 TABLET BEDTIME PRN (Active)

* CloNIDine HCl 0.2 MG Oral Tablet; TAKE 1 TABLET TWICE DAILY.; Start Date: 
  1900; End Date: 1900 (Active)

* Calcium TABS; TAKE 1 TABLET DAILY (Active)

* Vitamin D CAPS; TAKE 1 CAPSULE DAILY (Active)





Allergies and Adverse Reactions

* No Known Drug Allergies (Active)





Past Medical History

* History of Hypertension (401.9);        (Resolved)    

* History of Diabetes Mellitus (250.00);        (Resolved)    

* History of Insomnia (780.52);        (Resolved)    

* History of Vertigo (780.4);        (Resolved)    

* History of Rheumatoid Arthritis (714.0);        (Resolved)    

* History of Sciatica (724.3);        (Resolved)    

* History of Intervertebral Disc Degeneration (722.6);        (Resolved)    

* History of Osteopenia (733.90);        (Resolved)    

* History of Tinnitus (388.30);        (Resolved)    

* History of Hematuria (599.70);        (Resolved)    

* History of Cystocele (596.89);        (Resolved)    

* History of Acute Serous Otitis Media (381.01);        (Resolved)    

* History of History Of 3  Previous Pregnancies (V61.5);        (Resolved)    





Procedures





                Procedure       Procedure Date  Date Completed  Status

 

                Tonsillectomy With Adenoidectomy -               -              

 Resolved

 

                Appendectomy    -               -               Resolved

 

                Cholecystectomy -               -               Resolved

 

                Tubal Ligation  -               -               Resolved

 

                 Section -               -               Resolved

 

                Dilation And Curettage -               -               Resolved







Immunization

* Td

* Pneumo

* Influenza

* Fluzone Intramuscular Injectable (Lot #: [id number])  - Administered on: 
  2013





Family History

* Maternal history of Hypertension (V17.49);        (Active)    

* Paternal history of Hypertension (V17.49);        (Active)    

* Paternal history of Diabetes Mellitus (V18.0);        (Active)    

* Paternal history of Carcinoma Of The Pancreas       (Active)    

* Paternal history of Stroke Complications       (Active)    

* Maternal history of Coronary Artery Disease (V17.49);        (Active)    

* Maternal history of Acute Myocardial Infarction (V17.3);        (Active)    





Social History

* Marital History - Currently        (Active)    

* Never A Smoker       (Active)    

* Never Used Drugs       (Active)    

* Being A Social Drinker       (Active)    





Treatment Plan

* MA Bone Density DXA Dual Energy 76183 2013 Routine

* MA Breast mammogram screen bilateral 44486 2013 Routine

* [[facility]] MICROALBUMIN, RANDOM URINE (W/CREATININE) 2013 Routine

* Physical Therapy Referral 2013 Routine





Advance Directives

* No Advance Directives available.





Encounters

* AUDIT 2013

## 2020-06-11 NOTE — XMS REPORT
Continuity of Care Document

                             Created on: 2020



RUBEN RAO

External Reference #: [id number]

: 1945

Sex: Female



Demographics





                          Address                   [address] [place]  [address]

 

                          Home Phone                [phone]

 

                          Preferred Language        English

 

                          Marital Status            Unknown

 

                          Yarsani Affiliation     Unknown

 

                          Race                      Unknown

 

                          Ethnic Group              Unknown





Author





                          Author                    [facility] [employer]              [employer]

 [email or web address]

 

                          Address                   Unknown

 

                          Phone                     Unavailable







Care Team Providers





                    Care Team Member Name Role                Phone

 

                    [facility] [employer] Unavailable         Un

available



                                    



Problems

                    



                    Problem                         Status                      

   Onset Date       

                          Classification                         Date Reported  

         

                          Comments                         Source               

     

 

                    Osteopenia                         Active                   

                    

                                             2014                         

          

                                        [facility]                    

 

                    Diabetes Mellitus                         Active            

                    

                                             2014                         

   

                                        [facility]                    

 

                    Hypertension                         Active                 

                    

                                             2014                         

        

                                        [facility]                    

 

                          Joint Pain, Localized In The Shoulder                 

        Active            

                                                                      2014

        

                                                    [facility]               

     

 

                    Sciatica                         Active                     

                    

                                             2014                         

            

                                        [facility]                    

 

                    Breast Pain                         Active                  

                    

                                             2014                         

         

                                        [facility]                    

 

                          Multiple Environmental Allergies                      

   Active                 

                                                                      2014

             

                                                    [facility]               

     

 

                    Insomnia                         Active                     

                    

                                             2014                         

            

                                        [facility]                    

 

                    Rheumatoid Arthritis                         Active         

                    

                                             2014                         

                                        [facility]                    

 

                    Allergic Rhinitis                         Active            

                    

                                             2014                         

   

                                        [facility]                    



                                                                                
                                                                                
                                                      



Medications

                    



                    Medication                         Details                  

       Route        

                          Status                         Patient Instructions   

          

                          Ordering Provider                         Order Date  

               

                                        Source                    

 

                          Fluticasone Propionate 50 MCG/ACT Nasal Suspension    

                     ; 

Start Date: 2014; End Date: 1900 (Active)                           

                      Active                                                    

                          2014                         [facility]      

    

         

 

                          Azithromycin 500 MG Oral Tablet                       

  ; Start Date: 

2014; End Date: 2014 (Active)                                       

                    Active                                                      

          

                          2014                         [facility]      

              

 

                          Benzonatate 200 MG Oral Capsule                       

  ; Start Date: 

2014; End Date: 2014 (Active)                                       

                    Active                                                      

          

                          2014                         [facility]      

              

 

                          PredniSONE 20 MG Oral Tablet                         ;

 Start Date: 2014; 

End Date: 2014 (Active)                                                   

Active                                                                          

 

                          2014                         [facility]      

              

 

                          Fluticasone Propionate 50 MCG/ACT Nasal Suspension    

                     ; 

Start Date: 2014; End Date: 1900 (Active)                           

                      Active                                                    

                          2014                         [facility]      

    

         

 

                          Meloxicam 15 MG Oral Tablet                         ; 

Start Date: 2013; 

End Date: 1900 (Active)                                                   

Active                                                                          

 

                          2013                         [facility]      

              

 

                          MetFORMIN HCl 500 MG Oral Tablet                      

   ; Start Date: 

1900; End Date: 1900 (Active)                                       

                    Inactive                                                    

          

                          1900                         [facility]      

              

 

                          Lisinopril-Hydrochlorothiazide 20-12.5 MG Oral Tablet 

                        ; 

Start Date: 1900; End Date: 1900 (Active)                           

                      Inactive                                                  

                          1900                         [facility]      

  

           

 

                          CloNIDine HCl 0.2 MG Oral Tablet                      

   ; Start Date: 

1900; End Date: 1900 (Active)                                       

                    Inactive                                                    

          

                          1900                         [facility]      

              

 

                          Actonel 35 MG Oral Tablet                         ; St

art Date: 1900; End 

Date: 1900 (Active)                                                   

Inactive                                                                        

 

                          1900                         [facility]      

              

 

                          PredniSONE 10 MG Oral Tablet                          

(Active)                  

                                             Active                             

              

                                                                      [facility]

ans           

        

 

                          Fluticasone Propionate SUSP                          (

Active)                   

                                             Active                             

               

                                                                      [facility]

ans            

       

 

                          MetFORMIN HCl 500 MG Oral Tablet                      

    (Active)              

                                             Active                             

          

                                                                      [facility]

ans       

            

 

                          Ambien 5 MG Oral Tablet                          (Acti

ve)                       

                                             Active                             

                   

                                                                      [facility]

ans                

   

 

                          Lisinopril-Hydrochlorothiazide 20-12.5 MG Oral Tablet 

                         

(Active)                                                   Active               

 

                                                                                

            

                                        [facility]                    

 

                    Actonel TABS                          (Active)              

                    

                    Active                                                      

     

                                                    [facility]               

     

 

                    Melatonin TABS                          (Active)            

                    

                    Active                                                      

   

                                                    [facility]               

     

 

                          CloNIDine HCl 0.2 MG Oral Tablet                      

    (Active)              

                                             Active                             

          

                                                                      [facility]

ans       

            

 

                    Calcium TABS                          (Active)              

                    

                    Active                                                      

     

                                                    [facility]               

     

 

                    Vitamin D CAPS                          (Active)            

                    

                    Active                                                      

   

                                                    [facility]               

     

 

                    Vitamin E CAPS                          (Active)            

                    

                    Active                                                      

   

                                                    [facility]               

     

 

                    Co Q 10 CAPS                          (Active)              

                    

                    Active                                                      

     

                                                    [facility]               

     

 

                    Sleep Aid TABS                          (Active)            

                    

                    Active                                                      

   

                                                    [facility]               

     



                                                                                
                                                                                
                                                                                
                                                                                
                                                                                
                               



Allergies, Adverse Reactions, Alerts

                    



                    Substance                         Category                  

       Reaction     

                          Severity                         Reaction type        

       

                    Status                         Date Reported                

         

Comments                                Source                    

 

                          No Known Drug Allergies                         drug a

llergy                    

                                                                      drug aller

gy              

                    Active                                                      

          

                                        [facility]                    



                                                        



Immunizations

                    



                    Immunization                         Date Given             

            Site    

                          Status                         Last Updated           

      

                          Comments                         Source               

     

 

                          Fluzone Intramuscular Injectable                      

   2013             

                                             completed                          

         

                                                    [facility]               

     

 

                Td                                                              

             

completed                                                                       

 

                                        [facility]                    

 

                    Pneumo                                                      

                    

                    completed                                                   

                    

                                        [facility]                    

 

                    Influenza                                                   

                    

                    completed                                                   

                 

                                        [facility]                    



                                                                                
            



Results





                                        No Data Provided for This Section



                    



Pathology Reports





                                        No Data Provided for This Section       

             



                            



Diagnostic Reports

            



                                        No Data Provided for This Section       

             



                                                            



Consultation Notes

                    



                                        No Data Provided for This Section       

             



                                                            



Discharge Summaries

                    



                                        No Data Provided for This Section       

             



                                                            



History and Physicals

                    



                                        No Data Provided for This Section       

             



                                                                



Vital Signs

                         



                                        No Data Provided for This Section



                                                                 



Encounters

                    



                    Location                         Location Details           

              

Encounter Type                         Encounter Number                         

Reason For Visit                         Attending Provider                     

                    ADM Date                         DC Date                    

     Status      

                                        Source                    

 

                                                                  AUDIT         

                

[id number]                                                                        

 

                    2013               

                

                                        [facility]                    

 

                                                                      Timur CALVIN

dina: KAR NASSAR, 

Status: Pen, Time: 10:15 AM                         [id number]                    

                                                                        20

13              

                    2013                                                  

[facility]                    

 

                                                                  AUDIT         

                

[id number]                                                                        

 

                    2013               

                

                                        [facility]                    

 

                                                                  AUDIT         

                

[id number]                                                                        

 

                    2013               

                

                                        [facility]                    

 

                                                                      Timur MCQUEEN

dina: KAR NASSAR, 

Status: Pen, Time: 8:30 AM                         [id number]                     

                                                                        10/15/20

13               

                    2013                                                  

[facility]                    

 

                                                                  AUDIT         

                

[id number]                                                                        

 

                    2013               

                

                                        [facility]                    

 

                                                                  AUDIT         

                

[id number]                                                                        

 

                    2014               

                

                                        [facility]                    

 

                                                                  AUDIT         

                

[id number]                                                                        

 

                    2014               

                

                                        [facility]                    

 

                                                                  AUDIT         

                

[id number]                                                                        

 

                    2014               

                

                                        [facility]                    



                                                                                
                                                                                
      



Procedures

        



                                        No Data Provided for This Section



                                                    



Assessment and Plan

                    



                                        No Data Provided for This Section       

             



                                    



Plan of Care





                    Plan of Care        Date                Source

 

                                        MA Bone Density DXA Dual Energy 12147  Routine[facility] Breast mammogram 

screen bilateral [id number] 2013 [facility] Digital Mammo DX Uni  
2014 Routine[QLH] HEMOGLOBIN A1c 2014 Routine                       
                          2014                         [facility]      

              

 

                                        MA Bone Density DXA Dual Energy 78333  [facility]ine[facility] Breast mammogram 

screen bilateral 65914 2013 [facility] Digital Mammo DX [facility]  
2014 Routine                         2014                         [facility]                    

 

                                        MA Bone Density DXA Dual Energy 13295  [facility]ine[facility] Breast mammogram 

screen bilateral 11228 2013 [facility] Digital Mammo DX [facility]  
2014 Routine                         2014                         [facility]                    

 

                                        MA Bone Density DXA Dual Energy 85918  RoutineMA Breast mammogram 

screen bilateral [id number] 2013 Routine[QLH] MICROALBUMIN, RANDOM URINE 
(W/CREATININE) 2013 RoutinePhysical Therapy Referral 2013 Routine   
                          2013                         [facility]      

    

          

 

                                        [QLH] CBC (INCLUDES DIFF/PLT) 2013

 Routine[QLH] CMP W/EGFR 2013 

Routine[Q] LIPID PANEL WITH REFLEX TO DIRECT LDL 2013 Routine[QLH] TSH, 
3RD GENERATION 2013 Routine[QLH] HEMOGLOBIN A1c 2013 Routine        
                          2013                         [facility]      

         

     



                                                                                
                                                        



Social History

                    



                    Social History                         Date                 

        Source      

              

 

                                        Marital History - Currently      

   (Active)     Never A Smoker        

(Active)     Never Used Drugs        (Active)     Being A Social Drinker        
(Active)                              2014                         [facility]                    



                                                                        



Family History

                    



                    Value                         Date                         S

ource               

     

 

                                        Maternal history of Hypertension (V17.49

);         (Active)     Paternal history

 of Hypertension (V17.49);         (Active)     Paternal history of Diabetes 
Mellitus (V18.0);         (Active)     Paternal history of Carcinoma Of The 
Pancreas        (Active)     Paternal history of Stroke Complications        
(Active)     Maternal history of Coronary Artery Disease (V17.49);         
(Active)     Maternal history of Acute Myocardial Infarction (V17.3);         
(Active)                              2014                         [facility]                    

 

                                        Maternal history of Hypertension (V17.49

);         (Active)     Paternal history

 of Hypertension (V17.49);         (Active)     Paternal history of Diabetes 
Mellitus (V18.0);         (Active)     Paternal history of Carcinoma Of The 
Pancreas        (Active)     Paternal history of Stroke Complications        
(Active)     Maternal history of Coronary Artery Disease (V17.49);         
(Active)     Maternal history of Acute Myocardial Infarction (V17.3);         
(Active)                              2014                         [facility]                    

 

                                        Maternal history of Hypertension (V17.49

);         (Active)     Paternal history

 of Hypertension (V17.49);         (Active)     Paternal history of Diabetes 
Mellitus (V18.0);         (Active)     Paternal history of Carcinoma Of The 
Pancreas        (Active)     Paternal history of Stroke Complications        
(Active)     Maternal history of Coronary Artery Disease (V17.49);         
(Active)     Maternal history of Acute Myocardial Infarction (V17.3);         
(Active)                              2014                         [facility]                    

 

                                        Maternal history of Hypertension (V17.49

);         (Active)     Paternal history

 of Hypertension (V17.49);         (Active)     Paternal history of Diabetes 
Mellitus (V18.0);         (Active)     Paternal history of Carcinoma Of The 
Pancreas        (Active)     Paternal history of Stroke Complications        
(Active)     Maternal history of Coronary Artery Disease (V17.49);         
(Active)     Maternal history of Acute Myocardial Infarction (V17.3);         
(Active)                              2013                         [facility]                    

 

                                        Maternal history of Hypertension (V17.49

);         (Active)     Paternal history

 of Hypertension (V17.49);         (Active)     Paternal history of Diabetes 
Mellitus (V18.0);         (Active)     Paternal history of Carcinoma Of The 
Pancreas        (Active)     Paternal history of Stroke Complications        
(Active)     Maternal history of Coronary Artery Disease (V17.49);         
(Active)     Maternal history of Acute Myocardial Infarction (V17.3);         
(Active)                              2013                         [facility]                    

 

                                        Maternal history of Hypertension (V17.49

);         (Active)     Paternal history

 of Hypertension (V17.49);         (Active)     Paternal history of Diabetes 
Mellitus (V18.0);         (Active)     Paternal history of Carcinoma Of The 
Pancreas        (Active)     Paternal history of Stroke Complications        
(Active)     Maternal history of Coronary Artery Disease (V17.49);         
(Active)     Maternal history of Acute Myocardial Infarction (V17.3);         
(Active)                              2013                         [facility]                    

 

                                        Maternal history of Hypertension (V17.49

);         (Active)     Paternal history

 of Hypertension (V17.49);         (Active)     Paternal history of Diabetes 
Mellitus (V18.0);         (Active)     Paternal history of Carcinoma Of The 
Pancreas        (Active)     Paternal history of Stroke Complications        
(Active)     Maternal history of Coronary Artery Disease (V17.49);         
(Active)     Maternal history of Acute Myocardial Infarction (V17.3);         
(Active)                              2013                         [facility]                    



                                                                                
                                                                                
        



Advance Directives

                    



                    Order Name                         Results                  

       Value        

                          Date                         Source                   

 

 

                          Advance Directives                         Advance Dir

ectives                   

                          No Advance Directives available.                      

   2014       

                                        [facility]                    

 

                          Advance Directives                         Advance Dir

ectives                   

                          No Advance Directives available.                      

   2014       

                                        [facility]                    

 

                          Advance Directives                         Advance Dir

ectives                   

                          No Advance Directives available.                      

   2014       

                                        [facility]                    

 

                          Advance Directives                         Advance Dir

ectives                   

                          No Advance Directives available.                      

   2013       

                                        [facility]                    

 

                          Advance Directives                         Advance Dir

ectives                   

                          No Advance Directives available.                      

   2013       

                                        [facility]                    

 

                          Advance Directives                         Advance Dir

ectives                   

                          No Advance Directives available.                      

   2013       

                                        [facility]                    

 

                          Advance Directives                         Advance Dir

ectives                   

                          No Advance Directives available.                      

   2013       

                                        [facility]                    



                                                                                
                                                                                
    



Functional Status

                    



                                        No Data Provided for This Section

## 2020-06-11 NOTE — XMS REPORT
[facility] [address] Medical Summary

                             Created on: 2013



RAO RUBEN

External Reference #: [id number]

: 1945

Sex: Female



Demographics





                          Address                   [address] [place]  [address]

 

                          Home Phone                [phone]

 

                          Preferred Language        English

 

                          Marital Status            Unknown

 

                          Zoroastrianism Affiliation     Unknown

 

                          Race                      Unknown

 

                          Ethnic Group              Unknown





Author





                          Author                    RUBEN Gill

 

                          Organization              Unknown

 

                          Address                   Unknown

 

                          Phone                     [phone]







Care Team Providers





                    Care Team Member Name Role                Phone

 

                    Brooklyn Gill      PP                  [phone]

 

                          Unavailable               [phone]







Reason for Referral

No Reason for Referral was given.



History of Present Illness

No HPI available.



Problems

* Normal Routine History And Physical Senior Citizen (65-80) (V70.0);        (
  Active)    

* Osteopenia (733.90);        (Active)    

* Diabetes Mellitus (250.00);        (Active)    

* Hypertension (401.9);        (Active)    

* Joint Pain, Localized In The Shoulder (725.13);        (Active)    





Medication

* MetFORMIN HCl 500 MG Oral Tablet; TAKE 1 TABLET TWICE DAILY; Start Date: 
  1900; End Date: 1900 (Active)

* Lisinopril-Hydrochlorothiazide 20-12.5 MG Oral Tablet; TAKE 1 TABLET DAILY.; 
  Start Date: 1900; End Date: 1900 (Active)

* Actonel TABS; weekly (Active)

* Melatonin TABS; TAKE AS DIRECTED PER PACKAGE INSTRUCTIONS. (Active)

* CloNIDine HCl 0.2 MG Oral Tablet; TAKE 1 TABLET TWICE DAILY.; Start Date: 
  1900; End Date: 1900 (Active)

* Calcium TABS (Active)

* Vitamin D CAPS (Active)

* Vitamin E CAPS; TAKE 1 CAPSULE DAILY. (Active)

* Co Q 10 CAPS; TAKE AS DIRECTED. (Active)

* Meloxicam 15 MG Oral Tablet; TAKE 1 TABLET DAILY WITH FOOD.; Start Date: 
  2013; End Date: 1900 (Active)





Allergies and Adverse Reactions

* No Known Drug Allergies (Active)





Past Medical History

* History of Hypertension (401.9);        (Resolved)    

* History of Diabetes Mellitus (250.00);        (Resolved)    

* History of Insomnia (780.52);        (Resolved)    

* History of Vertigo (780.4);        (Resolved)    

* History of Rheumatoid Arthritis (714.0);        (Resolved)    

* History of Sciatica (724.3);        (Resolved)    

* History of Intervertebral Disc Degeneration (722.6);        (Resolved)    

* History of Osteopenia (733.90);        (Resolved)    

* History of Tinnitus (388.30);        (Resolved)    

* History of Hematuria (599.70);        (Resolved)    

* History of Cystocele (596.89);        (Resolved)    

* History of Acute Serous Otitis Media (381.01);        (Resolved)    

* History of History Of 3  Previous Pregnancies (V61.5);        (Resolved)    





Procedures





                Procedure       Procedure Date  Date Completed  Status

 

                Tonsillectomy With Adenoidectomy -               -              

 Resolved

 

                Appendectomy    -               -               Resolved

 

                Cholecystectomy -               -               Resolved

 

                Tubal Ligation  -               -               Resolved

 

                 Section -               -               Resolved

 

                Dilation And Curettage -               -               Resolved







Immunization

* Td

* Pneumo

* Influenza





Family History

* Maternal history of Hypertension (V17.49);        (Active)    

* Paternal history of Hypertension (V17.49);        (Active)    

* Paternal history of Diabetes Mellitus (V18.0);        (Active)    

* Paternal history of Carcinoma Of The Pancreas       (Active)    

* Paternal history of Stroke Complications       (Active)    

* Maternal history of Coronary Artery Disease (V17.49);        (Active)    

* Maternal history of Acute Myocardial Infarction (V17.3);        (Active)    





Social History

* Marital History - Currently        (Active)    

* Never A Smoker       (Active)    

* Never Used Drugs       (Active)    

* Being A Social Drinker       (Active)    





Treatment Plan

* [[facility]] CBC (INCLUDES DIFF/PLT) 2013 Routine

* [[facility]] CMP W/EGFR 2013 Routine

* [Q] LIPID PANEL WITH REFLEX TO DIRECT LDL 2013 Routine

* [[facility]] TSH, 3RD GENERATION 2013 Routine

* [[facility]] HEMOGLOBIN A1c 2013 Routine





Advance Directives

* No Advance Directives available.





Encounters

* AUDIT 2013 

* [facility], Provider: KAR NASSAR, Status: Pen, Time: 8:30 AM 10/15/2013

## 2020-06-11 NOTE — XMS REPORT
[facility] Medical Summary

                             Created on: 2013



RUBEN RAO

External Reference #: [id number]

: 1945

Sex: Female



Demographics





                          Address                   [address] [place]  [address]

 

                          Home Phone                [phone]

 

                          Preferred Language        English

 

                          Marital Status            Unknown

 

                          B[employer] Affiliation     Unknown

 

                          Race                      Unknown

 

                          Ethnic Group              Unknown





Author





                          Author                    RUBEN Adame Te

[facility]

 

                          Organization              Unknown

 

                          Address                   Unknown

 

                          Phone                     [phone]







Care Team Providers





                    Care Team Member Name Role                Phone

 

                    JaniedaylinBismark barragan PP                  [phone]







Reason for Referral

No Reason for Referral was given.



History of Present Illness

No HPI available.



Problems

* Normal Routine History And Physical Senior Citizen (65-80) (V70.0);        (
  Active)    





Medication

* PredniSONE 10 MG Oral Tablet (Active)

* Fluticasone Propionate SUSP (Active)

* MetFORMIN HCl 500 MG Oral Tablet (Active)

* Ambien 5 MG Oral Tablet (Active)

* Lisinopril-Hydrochlorothiazide 20-12.5 MG Oral Tablet (Active)

* Actonel TABS; weekly (Active)

* Melatonin TABS (Active)

* CloNIDine HCl 0.2 MG Oral Tablet; TAKE 1 TABLET DAILY AT BEDTIME. (Active)

* Calcium TABS (Active)

* Vitamin D CAPS (Active)

* Vitamin E CAPS (Active)

* Co Q 10 CAPS (Active)





Allergies and Adverse Reactions

* No Known Drug Allergies (Active)





Past Medical History

* History of Hypertension (401.9);        (Resolved)    

* History of Diabetes Mellitus (250.00);        (Resolved)    

* History of Insomnia (780.52);        (Resolved)    

* History of Vertigo (780.4);        (Resolved)    

* History of Rheumatoid Arthritis (714.0);        (Resolved)    

* History of Sciatica (724.3);        (Resolved)    

* History of Intervertebral Disc Degeneration (722.6);        (Resolved)    

* History of Osteopenia (733.90);        (Resolved)    

* History of Tinnitus (388.30);        (Resolved)    

* History of Hematuria (599.70);        (Resolved)    

* History of Cystocele (596.89);        (Resolved)    

* History of Acute Serous Otitis Media (381.01);        (Resolved)    

* History of History Of 3  Previous Pregnancies (V61.5);        (Resolved)    





Procedures





                Procedure       Procedure Date  Date Completed  Status

 

                Tonsillectomy With Adenoidectomy -               -              

 Resolved

 

                Appendectomy    -               -               Resolved

 

                Cholecystectomy -               -               Resolved

 

                Tubal Ligation  -               -               Resolved

 

                 Section -               -               Resolved

 

                Dilation And Curettage -               -               Resolved







Immunization

* Td

* Pneumo

* Influenza





Family History

* Maternal history of Hypertension (V17.49);        (Active)    

* Paternal history of Hypertension (V17.49);        (Active)    

* Paternal history of Diabetes Mellitus (V18.0);        (Active)    

* Paternal history of Carcinoma Of The Pancreas       (Active)    

* Paternal history of Stroke Complications       (Active)    

* Maternal history of Coronary Artery Disease (V17.49);        (Active)    

* Maternal history of Acute Myocardial Infarction (V17.3);        (Active)    





Social History

* Marital History - Currently        (Active)    

* Never A Smoker       (Active)    

* Never Used Drugs       (Active)    

* Being A Social Drinker       (Active)    





Advance Directives

* No Advance Directives available.





Encounters

* AUDIT 2013 

* [facility], Provider: KAR NASSAR, Status: Pen, Time: 10:15 AM 2013

## 2020-06-11 NOTE — XMS REPORT
[facility] [address] Medical Summary

                             Created on: 2014



SHASHA RAOZABETH

External Reference #: [id number]

: 1945

Sex: Female



Demographics





                          Address                   [address] [place]  [address]

 

                          Home Phone                [phone]

 

                          Preferred Language        English

 

                          Marital Status            Unknown

 

                          [employer] Affiliation     Unknown

 

                          Race                      Unknown

 

                          Ethnic Group              Unknown





Author





                          Author                    RUBEN HERRERA

 

                          Organization              Unknown

 

                          Address                   Unknown

 

                          Phone                     [phone]







Support





                Name            Relationship    Address         Phone

 

                    RAUL IRENE                ,

Unknown                                 [phone]







Care Team Providers





                    Care Team Member Name Role                Phone

 

                    SHARON PADMINI        PP                  [phone]

 

                          Unavailable               [phone]







Reason for Referral

No Reason for Referral was given.



History of Present Illness

No HPI available.



Problems

* Normal Routine History And Physical Senior Citizen (65-80) (V70.0);        (
  Active)    

* Osteopenia (733.90);        (Active)    

* Insomnia (780.52);        (Active)    

* Rheumatoid Arthritis (714.0);        (Active)    

* Allergic Rhinitis (477.9);        (Active)    

* Hypertension (401.9);        (Active)    

* Diabetes Mellitus (250.00);        (Active)    





Medication

* MetFORMIN HCl 500 MG Oral Tablet; TAKE 1 TABLET TWICE DAILY; Start Date: 
  1900; End Date: 1900 (Active)

* Lisinopril-Hydrochlorothiazide 20-12.5 MG Oral Tablet; TAKE 1 TABLET DAILY.; 
  Start Date: 1900; End Date: 1900 (Active)

* Vitamin E CAPS; TAKE 1 CAPSULE DAILY. (Active)

* Sleep Aid TABS; TAKE 1 TABLET BEDTIME PRN (Active)

* Calcium TABS; TAKE 1 TABLET DAILY (Active)

* Vitamin D CAPS; TAKE 1 CAPSULE DAILY (Active)

* Melatonin TABS; TAKE 1 TABLET BEDTIME (Active)

* Co Q 10 CAPS; TAKE 1 CAPSULE DAILY (Active)

* CloNIDine HCl 0.2 MG Oral Tablet; TAKE 1 TABLET TWICE DAILY.; Start Date: 
  1900; End Date: 1900 (Active)

* Fluticasone Propionate 50 MCG/ACT Nasal Suspension; SPRAY 2 SQUIRTS INTO EACH 
  NOSTRIL ONCE DAILY FOR ALLERGY PREVENTION; Start Date: 2014; End Date: 
  1900 (Active)

* Azithromycin 500 MG Oral Tablet; TAKE 1 TABLET DAILY FOR 7 DAYS FOR BRONCHITIS
  AND SINUS INFECTION.; Start Date: 2014; End Date: 2014 (Active)

* Benzonatate 200 MG Oral Capsule; TAKE 1 CAPSULE 3 TIMES DAILY FOR COUGH; Start
  Date: 2014; End Date: 2014 (Active)





Allergies and Adverse Reactions

* No Known Drug Allergies (Active)





Past Medical History

* History of Sciatica (724.3);        (Resolved)    

* History of Intervertebral Disc Degeneration (722.6);        (Resolved)    

* History of Cystocele (596.89);        (Resolved)    

* History of History Of 3  Previous Pregnancies (V61.5);        (Resolved)    





Procedures





                Procedure       Procedure Date  Date Completed  Status

 

                Tonsillectomy With Adenoidectomy -               -              

 Resolved

 

                Appendectomy    -               -               Resolved

 

                Cholecystectomy -               -               Resolved

 

                Tubal Ligation  -               -               Resolved

 

                 Section -               -               Resolved

 

                Dilation And Curettage -               -               Resolved







Immunization

* Td

* Pneumo

* Influenza

* Fluzone Intramuscular Injectable (Lot #: [id number])  - Administered on: 
  2013





Family History

* Maternal history of Hypertension (V17.49);        (Active)    

* Paternal history of Hypertension (V17.49);        (Active)    

* Paternal history of Diabetes Mellitus (V18.0);        (Active)    

* Paternal history of Carcinoma Of The Pancreas       (Active)    

* Paternal history of Stroke Complications       (Active)    

* Maternal history of Coronary Artery Disease (V17.49);        (Active)    

* Maternal history of Acute Myocardial Infarction (V17.3);        (Active)    





Social History

* Marital History - Currently        (Active)    

* Never A Smoker       (Active)    

* Never Used Drugs       (Active)    

* Being A Social Drinker       (Active)    





Treatment Plan

* MA Bone Density DXA Dual Energy 71612 2013 Routine

* MA Breast mammogram screen bilateral 80964 2013 Routine

* MA Digital Mammo DX Uni  2014 Routine

* [[facility]] HEMOGLOBIN A1c 2014 Routine





Advance Directives

* No Advance Directives available.





Encounters

* AUDIT 2014

## 2020-06-11 NOTE — NUR
{null, 
PATIENT IN BED. CALL LIGHT WITHIN REACH. BEDSIDE SHIFT REPORT RECEIVED FROM PREVIOUS NURSE.
}

## 2020-06-11 NOTE — XMS REPORT
[facility] [address] Medical Summary

                             Created on: 2013



RAO RUBEN

External Reference #: [id number]

: 1945

Sex: Female



Demographics





                          Address                   [address] [place]  [address]

 

                          Home Phone                [phone]

 

                          Preferred Language        English

 

                          Marital Status            Unknown

 

                          Islam Affiliation     Unknown

 

                          Race                      Unknown

 

                          Ethnic Group              Unknown





Author





                          Author                    RUBEN NASSAR

 

                          Organization              Unknown

 

                          Address                   Unknown

 

                          Phone                     [phone]







Care Team Providers





                    Care Team Member Name Role                Phone

 

                    KAR NASSAR      PP                  [phone]

 

                          Unavailable               [phone]







Reason for Referral

No Reason for Referral was given.



History of Present Illness

No HPI available.



Problems

* Normal Routine History And Physical Senior Citizen (65-80) (V70.0);        (
  Active)    

* Osteopenia (733.90);        (Active)    

* Diabetes Mellitus (250.00);        (Active)    

* Hypertension (401.9);        (Active)    

* Joint Pain, Localized In The Shoulder (493.23);        (Active)    





Medication

* MetFORMIN HCl 500 MG Oral Tablet; TAKE 1 TABLET TWICE DAILY; Start Date: 
  1900; End Date: 1900 (Active)

* Lisinopril-Hydrochlorothiazide 20-12.5 MG Oral Tablet; TAKE 1 TABLET DAILY.; 
  Start Date: 1900; End Date: 1900 (Active)

* Actonel TABS; weekly (Active)

* Melatonin TABS; TAKE AS DIRECTED PER PACKAGE INSTRUCTIONS. (Active)

* CloNIDine HCl 0.2 MG Oral Tablet; TAKE 1 TABLET TWICE DAILY.; Start Date: 
  1900; End Date: 1900 (Active)

* Calcium TABS (Active)

* Vitamin D CAPS (Active)

* Vitamin E CAPS; TAKE 1 CAPSULE DAILY. (Active)

* Co Q 10 CAPS; TAKE AS DIRECTED. (Active)

* Meloxicam 15 MG Oral Tablet; TAKE 1 TABLET DAILY WITH FOOD.; Start Date: 
  2013; End Date: 1900 (Active)





Allergies and Adverse Reactions

* No Known Drug Allergies (Active)





Past Medical History

* History of Hypertension (401.9);        (Resolved)    

* History of Diabetes Mellitus (250.00);        (Resolved)    

* History of Insomnia (780.52);        (Resolved)    

* History of Vertigo (780.4);        (Resolved)    

* History of Rheumatoid Arthritis (714.0);        (Resolved)    

* History of Sciatica (724.3);        (Resolved)    

* History of Intervertebral Disc Degeneration (722.6);        (Resolved)    

* History of Osteopenia (733.90);        (Resolved)    

* History of Tinnitus (388.30);        (Resolved)    

* History of Hematuria (599.70);        (Resolved)    

* History of Cystocele (596.89);        (Resolved)    

* History of Acute Serous Otitis Media (381.01);        (Resolved)    

* History of History Of 3  Previous Pregnancies (V61.5);        (Resolved)    





Procedures





                Procedure       Procedure Date  Date Completed  Status

 

                Tonsillectomy With Adenoidectomy -               -              

 Resolved

 

                Appendectomy    -               -               Resolved

 

                Cholecystectomy -               -               Resolved

 

                Tubal Ligation  -               -               Resolved

 

                 Section -               -               Resolved

 

                Dilation And Curettage -               -               Resolved







Immunization

* Td

* Pneumo

* Influenza





Family History

* Maternal history of Hypertension (V17.49);        (Active)    

* Paternal history of Hypertension (V17.49);        (Active)    

* Paternal history of Diabetes Mellitus (V18.0);        (Active)    

* Paternal history of Carcinoma Of The Pancreas       (Active)    

* Paternal history of Stroke Complications       (Active)    

* Maternal history of Coronary Artery Disease (V17.49);        (Active)    

* Maternal history of Acute Myocardial Infarction (V17.3);        (Active)    





Social History

* Marital History - Currently        (Active)    

* Never A Smoker       (Active)    

* Never Used Drugs       (Active)    

* Being A Social Drinker       (Active)    





Advance Directives

* No Advance Directives available.





Encounters

* AUDIT 2013 

* [facility], Provider: KAR NASSAR, Status: Pen, Time: 8:30 AM 10/15/2013

## 2020-06-11 NOTE — NUR
{null, 
pt arrived to unit , resp even and labored, no c/o pain when pt arrived pt has family member 
at bedside , pt able to make needs known, pt oriented to room and call light, bed in lowest 
position, bed rails up x2, call light in reach.
}

## 2020-06-11 NOTE — XMS REPORT
[facility] [address] Medical Summary

                             Created on: 2014



RUBEN RAO

External Reference #: [id number]

: 1945

Sex: Female



Demographics





                          Address                   [address] [place]  [address]

 

                          Home Phone                [phone]

 

                          Preferred Language        English

 

                          Marital Status            Unknown

 

                          Gnosticist Affiliation     Unknown

 

                          Race                      Unknown

 

                          Ethnic Group              Unknown





Author





                          Author                    RUBEN HERRERA

 

                          Organization              Unknown

 

                          Address                   Unknown

 

                          Phone                     [phone]







Support





                Name            Relationship    Address         Phone

 

                    RAUL IRENE                ,

Unknown                                 [phone]







Care Team Providers





                    Care Team Member Name Role                Phone

 

                    SHARON, PADMINI        PP                  [phone]

 

                          Unavailable               [phone]







Reason for Referral

No Reason for Referral was given.



History of Present Illness

No HPI available.



Problems

* Diabetes Mellitus (250.00);        (Active)    

* Hypertension (401.9);        (Active)    

* Joint Pain, Localized In The Shoulder (719.41);        (Active)    

* Osteopenia (733.90);        (Active)    

* Normal Routine History And Physical Senior Citizen (65-80) (V70.0);        (
  Active)    

* Breast Pain (611.71);        (Active)    

* Multiple Environmental Allergies (V15.05);        (Active)    

* Sciatica (724.3);        (Active)    

* Insomnia (780.52);        (Active)    





Medication

* Lisinopril-Hydrochlorothiazide 20-12.5 MG Oral Tablet; TAKE 1 TABLET DAILY.; 
  Start Date: 1900; End Date: 1900 (Active)

* MetFORMIN HCl 500 MG Oral Tablet; TAKE 1 TABLET TWICE DAILY; Start Date: 
  1900; End Date: 1900 (Active)

* Vitamin E CAPS; TAKE 1 CAPSULE DAILY. (Active)

* CloNIDine HCl 0.2 MG Oral Tablet; TAKE 1 TABLET TWICE DAILY.; Start Date: 
  1900; End Date: 1900 (Active)

* Calcium TABS; TAKE 1 TABLET DAILY (Active)

* Vitamin D CAPS; TAKE 1 CAPSULE DAILY (Active)

* Sleep Aid TABS; TAKE 1 TABLET BEDTIME PRN (Active)

* Melatonin TABS; TAKE 1 TABLET BEDTIME (Active)

* Co Q 10 CAPS; TAKE 1 CAPSULE DAILY (Active)

* Fluticasone Propionate 50 MCG/ACT Nasal Suspension; USE 1 SPRAY IN EACH 
  NOSTRIL ONCE DAILY AS NEEDED; Start Date: 2014; End Date: 1900 
  (Active)





Allergies and Adverse Reactions

* No Known Drug Allergies (Active)





Past Medical History

* History of Hypertension (401.9);        (Resolved)    

* History of Diabetes Mellitus (250.00);        (Resolved)    

* History of Insomnia (780.52);        (Resolved)    

* History of Vertigo (780.4);        (Resolved)    

* History of Rheumatoid Arthritis (714.0);        (Resolved)    

* History of Sciatica (724.3);        (Resolved)    

* History of Intervertebral Disc Degeneration (722.6);        (Resolved)    

* History of Osteopenia (733.90);        (Resolved)    

* History of Tinnitus (388.30);        (Resolved)    

* History of Hematuria (599.70);        (Resolved)    

* History of Cystocele (596.89);        (Resolved)    

* History of Acute Serous Otitis Media (381.01);        (Resolved)    

* History of History Of 3  Previous Pregnancies (V61.5);        (Resolved)    





Procedures





                Procedure       Procedure Date  Date Completed  Status

 

                Tonsillectomy With Adenoidectomy -               -              

 Resolved

 

                Appendectomy    -               -               Resolved

 

                Cholecystectomy -               -               Resolved

 

                Tubal Ligation  -               -               Resolved

 

                 Section -               -               Resolved

 

                Dilation And Curettage -               -               Resolved







Immunization

* Td

* Pneumo

* Influenza

* Fluzone Intramuscular Injectable (Lot #: [id number])  - Administered on: 
  2013





Family History

* Maternal history of Hypertension (V17.49);        (Active)    

* Paternal history of Hypertension (V17.49);        (Active)    

* Paternal history of Diabetes Mellitus (V18.0);        (Active)    

* Paternal history of Carcinoma Of The Pancreas       (Active)    

* Paternal history of Stroke Complications       (Active)    

* Maternal history of Coronary Artery Disease (V17.49);        (Active)    

* Maternal history of Acute Myocardial Infarction (V17.3);        (Active)    





Social History

* Marital History - Currently        (Active)    

* Never A Smoker       (Active)    

* Never Used Drugs       (Active)    

* Being A Social Drinker       (Active)    





Treatment Plan

* MA Bone Density DXA Dual Energy 30970 2013 Routine

* MA Breast mammogram screen bilateral 31115 2013 Routine

* MA Digital Mammo DX Uni  2014 Routine





Advance Directives

* No Advance Directives available.





Encounters

* AUDIT 2014

## 2020-06-11 NOTE — XMS REPORT
[facility] [address] Medical Summary

                             Created on: 2014



RUBEN RAO

External Reference #: [id number]

: 1945

Sex: Female



Demographics





                          Address                   [address] [place]  [address]

 

                          Home Phone                [phone]

 

                          Preferred Language        English

 

                          Marital Status            Unknown

 

                          Jewish Affiliation     Unknown

 

                          Race                      Unknown

 

                          Ethnic Group              Unknown





Author





                          Author                    RUBEN MANZO

 

                          Organization              Unknown

 

                          Address                   Unknown

 

                          Phone                     [phone]







Support





                Name            Relationship    Address         Phone

 

                    RAUL IRENE                ,

Unknown                                 [phone]







Care Team Providers





                    Care Team Member Name Role                Phone

 

                    JOVANY MEGGAN         PP                  [phone]

 

                          Unavailable               [phone]







Reason for Referral

No Reason for Referral was given.



History of Present Illness

No HPI available.



Problems

* Diabetes Mellitus (250.00);        (Active)    

* Hypertension (401.9);        (Active)    

* Osteopenia (733.90);        (Active)    

* Normal Routine History And Physical Senior Citizen (65-80) (V70.0);        (
  Active)    

* Insomnia (780.52);        (Active)    

* Rheumatoid Arthritis (714.0);        (Active)    

* Allergic Rhinitis (477.9);        (Active)    





Medication

* Lisinopril-Hydrochlorothiazide 20-12.5 MG Oral Tablet; TAKE 1 TABLET DAILY.; 
  Start Date: 1900; End Date: 1900 (Active)

* MetFORMIN HCl 500 MG Oral Tablet; TAKE 1 TABLET TWICE DAILY; Start Date: 
  1900; End Date: 1900 (Active)

* Vitamin E CAPS; TAKE 1 CAPSULE DAILY. (Active)

* CloNIDine HCl 0.2 MG Oral Tablet; TAKE 1 TABLET TWICE DAILY.; Start Date: 
  1900; End Date: 1900 (Active)

* Calcium TABS; TAKE 1 TABLET DAILY (Active)

* Vitamin D CAPS; TAKE 1 CAPSULE DAILY (Active)

* Sleep Aid TABS; TAKE 1 TABLET BEDTIME PRN (Active)

* Melatonin TABS; TAKE 1 TABLET BEDTIME (Active)

* Co Q 10 CAPS; TAKE 1 CAPSULE DAILY (Active)

* Azithromycin 500 MG Oral Tablet; TAKE 1 TABLET DAILY FOR 7 DAYS FOR BRONCHITIS
  AND SINUS INFECTION.; Start Date: 2014; End Date: 2014 (Active)

* Benzonatate 200 MG Oral Capsule; TAKE 1 CAPSULE 3 TIMES DAILY FOR COUGH; Start
  Date: 2014; End Date: 2014 (Active)

* PredniSONE 20 MG Oral Tablet; TAKE 1 TABLET TWICE DAILY X 5 DAYS FOR SINUS 
  INFLAMMATION, THEN 1 DAILY FOR  5  DAYS; Start Date: 2014; End Date: 
  2014 (Active)

* Fluticasone Propionate 50 MCG/ACT Nasal Suspension; SPRAY 2 SQUIRTS INTO EACH 
  NOSTRIL ONCE DAILY FOR ALLERGY PREVENTION; Start Date: 2014; End Date: 
  1900 (Active)





Allergies and Adverse Reactions

* No Known Drug Allergies (Active)





Past Medical History

* History of Sciatica (724.3);        (Resolved)    

* History of Intervertebral Disc Degeneration (722.6);        (Resolved)    

* History of Cystocele (596.89);        (Resolved)    

* History of History Of 3  Previous Pregnancies (V61.5);        (Resolved)    





Procedures





                Procedure       Procedure Date  Date Completed  Status

 

                Tonsillectomy With Adenoidectomy -               -              

 Resolved

 

                Appendectomy    -               -               Resolved

 

                Cholecystectomy -               -               Resolved

 

                Tubal Ligation  -               -               Resolved

 

                 Section -               -               Resolved

 

                Dilation And Curettage -               -               Resolved







Immunization

* Td

* Pneumo

* Influenza

* Fluzone Intramuscular Injectable (Lot #: [id number])  - Administered on: 
  2013





Family History

* Maternal history of Hypertension (V17.49);        (Active)    

* Paternal history of Hypertension (V17.49);        (Active)    

* Paternal history of Diabetes Mellitus (V18.0);        (Active)    

* Paternal history of Carcinoma Of The Pancreas       (Active)    

* Paternal history of Stroke Complications       (Active)    

* Maternal history of Coronary Artery Disease (V17.49);        (Active)    

* Maternal history of Acute Myocardial Infarction (V17.3);        (Active)    





Social History

* Marital History - Currently        (Active)    

* Never A Smoker       (Active)    

* Never Used Drugs       (Active)    

* Being A Social Drinker       (Active)    





Treatment Plan

* MA Bone Density DXA Dual Energy 10149 2013 Routine

* MA Breast mammogram screen bilateral 53403 2013 Routine

* MA Digital Mammo DX Uni  2014 Routine





Advance Directives

* No Advance Directives available.





Encounters

* AUDIT 2014

## 2020-06-12 VITALS — SYSTOLIC BLOOD PRESSURE: 147 MMHG | DIASTOLIC BLOOD PRESSURE: 77 MMHG

## 2020-06-12 VITALS — DIASTOLIC BLOOD PRESSURE: 66 MMHG | SYSTOLIC BLOOD PRESSURE: 132 MMHG

## 2020-06-12 VITALS — DIASTOLIC BLOOD PRESSURE: 62 MMHG | SYSTOLIC BLOOD PRESSURE: 146 MMHG

## 2020-06-12 VITALS — DIASTOLIC BLOOD PRESSURE: 65 MMHG | SYSTOLIC BLOOD PRESSURE: 168 MMHG

## 2020-06-12 RX ADMIN — SODIUM CHLORIDE, POTASSIUM CHLORIDE, SODIUM LACTATE AND CALCIUM CHLORIDE SCH MLS/HR: 600; 310; 30; 20 INJECTION, SOLUTION INTRAVENOUS at 06:29

## 2020-06-12 RX ADMIN — CEFAZOLIN SODIUM SCH MLS/HR: 1 SOLUTION INTRAVENOUS at 04:00

## 2020-06-12 RX ADMIN — CLONIDINE HYDROCHLORIDE SCH MG: 0.2 TABLET ORAL at 08:39

## 2020-06-12 RX ADMIN — METFORMIN HYDROCHLORIDE SCH MG: 500 TABLET, FILM COATED ORAL at 08:39

## 2020-06-12 RX ADMIN — CEFAZOLIN SODIUM SCH MLS/HR: 1 SOLUTION INTRAVENOUS at 11:14

## 2020-06-12 NOTE — NUR
{null, 
Patient discharged home, , dressing changed on lower back, no drainage or bleeding, 
discharge instruction given, IV removed with tip intact, no ss of infiltration. family here 
to pick her
}

## 2020-06-12 NOTE — OPERATIVE REPORT
DATE OF PROCEDURE:  06/11/2020

 

SURGEON:  Kevin Diaz MD

 

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

 

PROCEDURE:  Redo right L3-4 laminotomy, medial facetectomy, and microsurgical

diskectomy, 04734. 

 

ANESTHESIA:  General.

 

INDICATIONS:  The patient is a 74-year-old woman who has previously undergone L3-4

bilateral laminectomy without diskectomy with good results.  She now presents with

recurrent low back pain radiating down the right leg and right leg weakness and is found

to have a newly herniated L3-4 disk herniation with inferior migration of the extruded

disk fragment into the right L4 lateral recess adjacent to the pedicle.  She was taken

to surgery for redo-laminotomy and microsurgical diskectomy. 

 

PROCEDURE IN DETAIL:  After induction of general anesthesia, the patient was placed on

the operating table in prone position over Henry frame.  Lumbar region was prepped and

draped in sterile fashion.  A preoperative x-ray was obtained.  A midline incision was

created over her previous incision scar.  The lumbar fascia was opened to the right of

midline and subperiosteal dissection was carried out to expose the right side of the

residual L3-4 facet joint and lamina.  Two x-rays were taken until correct localization

was confirmed.  The operating microscope was brought in.  A high-speed drill equipped by

fannie bur was used to extend the medial facetectomy at L3-4 slightly laterally.  The

lateral margin of dural sac and L4 traversing nerve root was exposed and followed all

the way down to the medial aspect of the pedicle.  The herniated disk material came into

view in the region of the axilla of the L4 nerve root.  This was mobilized with a micro

ball probe and removed as multiple fragments with a micropituitary rongeur until the

nerve root and the dural sac had become fully decompressed.  Meticulous hemostasis was

secured.  The retractor was removed.  The lumbar fascia was closed with 0 Vicryl suture.

 Subcutaneous layer was closed with 2-0 Vicryl sutures.  The skin was closed with 3-0

Monocryl sutures in subcuticular fashion.  Steri-Strips and dressing were applied.  The

patient was awakened, extubated, and taken to postanesthesia care unit in stable

condition.  No intraoperative complications were encountered. 

 

ESTIMATED BLOOD LOSS:  50 mL.

 

 

 

 

______________________________

Kevin Diaz MD PP/YASSINE

D:  06/11/2020 12:40:36

T:  06/11/2020 15:27:51

Job #:  [id number]